# Patient Record
Sex: FEMALE | Employment: FULL TIME | URBAN - METROPOLITAN AREA
[De-identification: names, ages, dates, MRNs, and addresses within clinical notes are randomized per-mention and may not be internally consistent; named-entity substitution may affect disease eponyms.]

---

## 2018-06-14 ENCOUNTER — LAB ENCOUNTER (OUTPATIENT)
Dept: LAB | Age: 38
End: 2018-06-14
Payer: COMMERCIAL

## 2018-06-14 DIAGNOSIS — Z01.419 CERVICAL SMEAR, AS PART OF ROUTINE GYNECOLOGICAL EXAMINATION: ICD-10-CM

## 2018-06-14 PROCEDURE — 88175 CYTOPATH C/V AUTO FLUID REDO: CPT

## 2018-06-14 PROCEDURE — 87624 HPV HI-RISK TYP POOLED RSLT: CPT

## 2018-10-27 ENCOUNTER — HOSPITAL (OUTPATIENT)
Dept: OTHER | Age: 38
End: 2018-10-27
Attending: EMERGENCY MEDICINE

## 2019-09-03 PROCEDURE — 87591 N.GONORRHOEAE DNA AMP PROB: CPT | Performed by: NURSE PRACTITIONER

## 2019-09-03 PROCEDURE — 87491 CHLMYD TRACH DNA AMP PROBE: CPT | Performed by: NURSE PRACTITIONER

## 2020-02-21 PROBLEM — Z30.430 ENCOUNTER FOR INSERTION OF INTRAUTERINE CONTRACEPTIVE DEVICE (IUD): Status: ACTIVE | Noted: 2020-02-21

## 2020-02-21 PROBLEM — Z30.433 ENCOUNTER FOR REMOVAL AND REINSERTION OF INTRAUTERINE CONTRACEPTIVE DEVICE: Status: ACTIVE | Noted: 2020-02-21

## 2020-03-16 ENCOUNTER — TELEPHONE (OUTPATIENT)
Dept: BEHAVIORAL HEALTH | Age: 40
End: 2020-03-16

## 2020-08-24 ENCOUNTER — TELEPHONE (OUTPATIENT)
Dept: BEHAVIORAL HEALTH | Age: 40
End: 2020-08-24

## 2020-08-24 RX ORDER — ESCITALOPRAM OXALATE 20 MG/1
20 TABLET ORAL DAILY
Qty: 30 TABLET | Refills: 3 | Status: SHIPPED | OUTPATIENT
Start: 2020-08-24 | End: 2020-08-27 | Stop reason: SDUPTHER

## 2020-08-27 ENCOUNTER — HOSPITAL ENCOUNTER (OUTPATIENT)
Dept: BEHAVIORAL HEALTH | Age: 40
Discharge: STILL ADMITTED OR ADMINISTRATIVE | End: 2020-08-27
Attending: CLINICAL NURSE SPECIALIST

## 2020-08-27 DIAGNOSIS — F41.1 GENERALIZED ANXIETY DISORDER: Primary | ICD-10-CM

## 2020-08-27 PROCEDURE — 99213 OFFICE O/P EST LOW 20 MIN: CPT | Performed by: CLINICAL NURSE SPECIALIST

## 2020-08-27 RX ORDER — ESCITALOPRAM OXALATE 20 MG/1
20 TABLET ORAL DAILY
Qty: 30 TABLET | Refills: 5 | Status: SHIPPED | OUTPATIENT
Start: 2020-08-27 | End: 2021-08-23 | Stop reason: SDUPTHER

## 2020-08-27 ASSESSMENT — ENCOUNTER SYMPTOMS
CONSTITUTIONAL NEGATIVE: 1
EYES NEGATIVE: 1
RESPIRATORY NEGATIVE: 1
HEMATOLOGIC/LYMPHATIC NEGATIVE: 1
ALLERGIC/IMMUNOLOGIC NEGATIVE: 1
SLEEP DISTURBANCE: 1
GASTROINTESTINAL NEGATIVE: 1
ENDOCRINE NEGATIVE: 1
NERVOUS/ANXIOUS: 1
NEUROLOGICAL NEGATIVE: 1

## 2021-03-20 ENCOUNTER — IMMUNIZATION (OUTPATIENT)
Dept: LAB | Age: 41
End: 2021-03-20

## 2021-03-20 DIAGNOSIS — Z23 NEED FOR VACCINATION: Primary | ICD-10-CM

## 2021-03-20 PROCEDURE — 91300 COVID 19 PFIZER-BIONTECH: CPT

## 2021-03-20 PROCEDURE — 0001A COVID 19 PFIZER-BIONTECH: CPT

## 2021-04-10 ENCOUNTER — IMMUNIZATION (OUTPATIENT)
Dept: LAB | Age: 41
End: 2021-04-10

## 2021-04-10 DIAGNOSIS — Z23 NEED FOR VACCINATION: Primary | ICD-10-CM

## 2021-04-10 PROCEDURE — 0002A COVID 19 PFIZER-BIONTECH: CPT | Performed by: NURSE PRACTITIONER

## 2021-04-10 PROCEDURE — 91300 COVID 19 PFIZER-BIONTECH: CPT | Performed by: NURSE PRACTITIONER

## 2021-07-20 PROBLEM — R87.611 PAPANICOLAOU SMEAR OF CERVIX WITH ATYPICAL SQUAMOUS CELLS CANNOT EXCLUDE HIGH GRADE SQUAMOUS INTRAEPITHELIAL LESION (ASC-H): Status: ACTIVE | Noted: 2021-07-20

## 2021-08-12 PROCEDURE — 88342 IMHCHEM/IMCYTCHM 1ST ANTB: CPT | Performed by: OBSTETRICS & GYNECOLOGY

## 2021-08-12 PROCEDURE — 87625 HPV TYPES 16 & 18 ONLY: CPT | Performed by: OBSTETRICS & GYNECOLOGY

## 2021-08-12 PROCEDURE — 88305 TISSUE EXAM BY PATHOLOGIST: CPT | Performed by: OBSTETRICS & GYNECOLOGY

## 2021-08-12 PROCEDURE — 87624 HPV HI-RISK TYP POOLED RSLT: CPT | Performed by: OBSTETRICS & GYNECOLOGY

## 2021-08-23 ENCOUNTER — TELEPHONE (OUTPATIENT)
Dept: BEHAVIORAL HEALTH | Age: 41
End: 2021-08-23

## 2021-08-23 RX ORDER — ESCITALOPRAM OXALATE 20 MG/1
20 TABLET ORAL DAILY
Qty: 30 TABLET | Refills: 3 | Status: SHIPPED | OUTPATIENT
Start: 2021-08-23

## 2021-10-04 RX ORDER — ACETAMINOPHEN 500 MG
1000 TABLET ORAL ONCE
Status: CANCELLED | OUTPATIENT
Start: 2021-10-04 | End: 2021-10-04

## 2021-10-05 ENCOUNTER — LAB ENCOUNTER (OUTPATIENT)
Dept: LAB | Age: 41
End: 2021-10-05
Attending: OBSTETRICS & GYNECOLOGY
Payer: COMMERCIAL

## 2021-10-05 DIAGNOSIS — N87.9 CERVICAL DYSPLASIA: ICD-10-CM

## 2021-10-06 ENCOUNTER — ANESTHESIA EVENT (OUTPATIENT)
Dept: SURGERY | Facility: HOSPITAL | Age: 41
End: 2021-10-06
Payer: COMMERCIAL

## 2021-10-06 NOTE — ANESTHESIA PREPROCEDURE EVALUATION
PRE-OP EVALUATION    Patient Name: Jean Paul Bains    Admit Diagnosis: CERVICAL DYSPLASIA    Pre-op Diagnosis: CERVICAL DYSPLASIA    LOOP ELECTRICAL EXCISION PROCEDURE OF CERVIX/ COLPOSCOPY    Anesthesia Procedure: LOOP ELECTRICAL EXCISION PROCEDURE OF CERVI discussed with surgeon and patient.     Comment: I explained the intrinsic risks of MAC anesthesia to Cam Herson including intraoperative awareness/recall, PONV, post-operative pain/discomfort, risk of aspiration, sore throat, airway management and, conv

## 2021-10-07 ENCOUNTER — ANESTHESIA (OUTPATIENT)
Dept: SURGERY | Facility: HOSPITAL | Age: 41
End: 2021-10-07
Payer: COMMERCIAL

## 2021-10-07 ENCOUNTER — HOSPITAL ENCOUNTER (OUTPATIENT)
Facility: HOSPITAL | Age: 41
Setting detail: HOSPITAL OUTPATIENT SURGERY
Discharge: HOME | End: 2021-10-07
Attending: OBSTETRICS & GYNECOLOGY | Admitting: OBSTETRICS & GYNECOLOGY
Payer: COMMERCIAL

## 2021-10-07 VITALS
TEMPERATURE: 98 F | HEART RATE: 73 BPM | HEIGHT: 65 IN | DIASTOLIC BLOOD PRESSURE: 63 MMHG | RESPIRATION RATE: 16 BRPM | OXYGEN SATURATION: 97 % | SYSTOLIC BLOOD PRESSURE: 97 MMHG | WEIGHT: 141.63 LBS | BODY MASS INDEX: 23.6 KG/M2

## 2021-10-07 DIAGNOSIS — N87.9 CERVICAL DYSPLASIA: Primary | ICD-10-CM

## 2021-10-07 PROCEDURE — 88305 TISSUE EXAM BY PATHOLOGIST: CPT | Performed by: OBSTETRICS & GYNECOLOGY

## 2021-10-07 PROCEDURE — 0UBC8ZZ EXCISION OF CERVIX, VIA NATURAL OR ARTIFICIAL OPENING ENDOSCOPIC: ICD-10-PCS | Performed by: OBSTETRICS & GYNECOLOGY

## 2021-10-07 PROCEDURE — 81025 URINE PREGNANCY TEST: CPT

## 2021-10-07 PROCEDURE — 88307 TISSUE EXAM BY PATHOLOGIST: CPT | Performed by: OBSTETRICS & GYNECOLOGY

## 2021-10-07 RX ORDER — LIDOCAINE HYDROCHLORIDE AND EPINEPHRINE 10; 10 MG/ML; UG/ML
INJECTION, SOLUTION INFILTRATION; PERINEURAL AS NEEDED
Status: DISCONTINUED | OUTPATIENT
Start: 2021-10-07 | End: 2021-10-07 | Stop reason: HOSPADM

## 2021-10-07 RX ORDER — ONDANSETRON 2 MG/ML
4 INJECTION INTRAMUSCULAR; INTRAVENOUS EVERY 8 HOURS PRN
Status: CANCELLED | OUTPATIENT
Start: 2021-10-07

## 2021-10-07 RX ORDER — SODIUM CHLORIDE, SODIUM LACTATE, POTASSIUM CHLORIDE, CALCIUM CHLORIDE 600; 310; 30; 20 MG/100ML; MG/100ML; MG/100ML; MG/100ML
INJECTION, SOLUTION INTRAVENOUS CONTINUOUS
Status: DISCONTINUED | OUTPATIENT
Start: 2021-10-07 | End: 2021-10-07

## 2021-10-07 RX ORDER — KETOROLAC TROMETHAMINE 30 MG/ML
INJECTION, SOLUTION INTRAMUSCULAR; INTRAVENOUS AS NEEDED
Status: DISCONTINUED | OUTPATIENT
Start: 2021-10-07 | End: 2021-10-07 | Stop reason: SURG

## 2021-10-07 RX ORDER — ONDANSETRON 2 MG/ML
4 INJECTION INTRAMUSCULAR; INTRAVENOUS AS NEEDED
Status: DISCONTINUED | OUTPATIENT
Start: 2021-10-07 | End: 2021-10-07

## 2021-10-07 RX ORDER — METOCLOPRAMIDE HYDROCHLORIDE 5 MG/ML
INJECTION INTRAMUSCULAR; INTRAVENOUS AS NEEDED
Status: DISCONTINUED | OUTPATIENT
Start: 2021-10-07 | End: 2021-10-07 | Stop reason: SURG

## 2021-10-07 RX ORDER — ACETIC ACID 0.25 G/100ML
IRRIGANT IRRIGATION AS NEEDED
Status: DISCONTINUED | OUTPATIENT
Start: 2021-10-07 | End: 2021-10-07 | Stop reason: HOSPADM

## 2021-10-07 RX ORDER — LIDOCAINE HYDROCHLORIDE 10 MG/ML
INJECTION, SOLUTION EPIDURAL; INFILTRATION; INTRACAUDAL; PERINEURAL AS NEEDED
Status: DISCONTINUED | OUTPATIENT
Start: 2021-10-07 | End: 2021-10-07 | Stop reason: SURG

## 2021-10-07 RX ORDER — ONDANSETRON 4 MG/1
4 TABLET, FILM COATED ORAL EVERY 8 HOURS PRN
Status: CANCELLED | OUTPATIENT
Start: 2021-10-07

## 2021-10-07 RX ORDER — HYDROCODONE BITARTRATE AND ACETAMINOPHEN 5; 325 MG/1; MG/1
2 TABLET ORAL AS NEEDED
Status: DISCONTINUED | OUTPATIENT
Start: 2021-10-07 | End: 2021-10-07

## 2021-10-07 RX ORDER — MIDAZOLAM HYDROCHLORIDE 1 MG/ML
INJECTION INTRAMUSCULAR; INTRAVENOUS AS NEEDED
Status: DISCONTINUED | OUTPATIENT
Start: 2021-10-07 | End: 2021-10-07 | Stop reason: SURG

## 2021-10-07 RX ORDER — ONDANSETRON 2 MG/ML
INJECTION INTRAMUSCULAR; INTRAVENOUS AS NEEDED
Status: DISCONTINUED | OUTPATIENT
Start: 2021-10-07 | End: 2021-10-07 | Stop reason: SURG

## 2021-10-07 RX ORDER — HYDROMORPHONE HYDROCHLORIDE 1 MG/ML
0.4 INJECTION, SOLUTION INTRAMUSCULAR; INTRAVENOUS; SUBCUTANEOUS EVERY 5 MIN PRN
Status: DISCONTINUED | OUTPATIENT
Start: 2021-10-07 | End: 2021-10-07

## 2021-10-07 RX ORDER — HYDROCODONE BITARTRATE AND ACETAMINOPHEN 5; 325 MG/1; MG/1
1 TABLET ORAL AS NEEDED
Status: DISCONTINUED | OUTPATIENT
Start: 2021-10-07 | End: 2021-10-07

## 2021-10-07 RX ORDER — ACETAMINOPHEN 500 MG
1000 TABLET ORAL ONCE AS NEEDED
Status: DISCONTINUED | OUTPATIENT
Start: 2021-10-07 | End: 2021-10-07

## 2021-10-07 RX ORDER — NALOXONE HYDROCHLORIDE 0.4 MG/ML
80 INJECTION, SOLUTION INTRAMUSCULAR; INTRAVENOUS; SUBCUTANEOUS AS NEEDED
Status: DISCONTINUED | OUTPATIENT
Start: 2021-10-07 | End: 2021-10-07

## 2021-10-07 RX ORDER — FERRIC SUBSULFATE 20-22G/100
SOLUTION, NON-ORAL MISCELLANEOUS AS NEEDED
Status: DISCONTINUED | OUTPATIENT
Start: 2021-10-07 | End: 2021-10-07 | Stop reason: HOSPADM

## 2021-10-07 RX ORDER — IODINE SOLUTION STRONG 5% (LUGOL'S) 5 %
SOLUTION ORAL AS NEEDED
Status: DISCONTINUED | OUTPATIENT
Start: 2021-10-07 | End: 2021-10-07 | Stop reason: HOSPADM

## 2021-10-07 RX ORDER — DEXAMETHASONE SODIUM PHOSPHATE 4 MG/ML
VIAL (ML) INJECTION AS NEEDED
Status: DISCONTINUED | OUTPATIENT
Start: 2021-10-07 | End: 2021-10-07 | Stop reason: SURG

## 2021-10-07 RX ADMIN — SODIUM CHLORIDE, SODIUM LACTATE, POTASSIUM CHLORIDE, CALCIUM CHLORIDE: 600; 310; 30; 20 INJECTION, SOLUTION INTRAVENOUS at 07:35:00

## 2021-10-07 RX ADMIN — LIDOCAINE HYDROCHLORIDE 50 MG: 10 INJECTION, SOLUTION EPIDURAL; INFILTRATION; INTRACAUDAL; PERINEURAL at 07:35:00

## 2021-10-07 RX ADMIN — KETOROLAC TROMETHAMINE 30 MG: 30 INJECTION, SOLUTION INTRAMUSCULAR; INTRAVENOUS at 08:22:00

## 2021-10-07 RX ADMIN — SODIUM CHLORIDE, SODIUM LACTATE, POTASSIUM CHLORIDE, CALCIUM CHLORIDE: 600; 310; 30; 20 INJECTION, SOLUTION INTRAVENOUS at 08:37:00

## 2021-10-07 RX ADMIN — MIDAZOLAM HYDROCHLORIDE 2 MG: 1 INJECTION INTRAMUSCULAR; INTRAVENOUS at 07:35:00

## 2021-10-07 RX ADMIN — METOCLOPRAMIDE HYDROCHLORIDE 10 MG: 5 INJECTION INTRAMUSCULAR; INTRAVENOUS at 07:44:00

## 2021-10-07 RX ADMIN — DEXAMETHASONE SODIUM PHOSPHATE 4 MG: 4 MG/ML VIAL (ML) INJECTION at 07:44:00

## 2021-10-07 RX ADMIN — ONDANSETRON 4 MG: 2 INJECTION INTRAMUSCULAR; INTRAVENOUS at 07:44:00

## 2021-10-07 NOTE — H&P
[facility] Patient Status:  Hospital Outpatient Surgery    1980 MRN [id number]   Location [address] [place] [address] Attending Karlee Lee MD   Hosp Day # 0 RICHY JACKMAN     SUBJECTIVE: 1      # Outcome Date GA Lbr Rigoberto/2nd Weight Sex Delivery Anes PTL Lv   3 AB 10/2006           2 Term 03/01/04 40w0d    NORMAL SPONT   SUNITA   1 AB 06/2003               Past GYN History:   Menarche: 15 (8/12/2021  5:06 PM)  Period Cycle (Days): 28 (8/1 noted.  Without clubbing or cyanosis.     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

## 2021-10-07 NOTE — OPERATIVE REPORT
OPERATIVE REPORT   PREOPERATIVE DIAGNOSIS: Cervical dysplasia. Hi grade DON  POSTOPERATIVE DIAGNOSIS: Cervical dysplasia. PROCEDURE PERFORMED:     1.  [facility] - Loop electrocautery excision procedure (LEEP)    2. Colposcopy  SURGEON:  Edith Mccurdy MD

## 2021-10-07 NOTE — ANESTHESIA POSTPROCEDURE EVALUATION
[id number] Patient Status:  Hospital Outpatient Surgery   Age/Gender 39year old female MRN [id number]   Location [address] [place] [address] Attending Brian Goldstein MD   Hosp Day # 0 PCP SAVANNA JACKMAN       Anesthesia Post

## 2021-10-28 PROBLEM — Z98.890 S/P LEEP: Status: ACTIVE | Noted: 2021-10-28

## 2025-06-25 NOTE — PROGRESS NOTES
GYN H&P     Genetic questionnaire reviewed with the patient and she will be referred for genetic counseling if the questionnaire had any positive results.    The [facility] intake form was also reviewed regarding contraception, menstrual periods, urinary health, and vaginal / sexual health    The patient was offered a medical chaperone during the visit    2025  5:52 PM    Chief Complaint   Patient presents with    Physical     Req. Blood work for STD screening, doing well       HPI: Anne is a 44 year old  Patient's last menstrual period was 06/10/2025 (approximate).  (contraception: Liletta IUD ) here for her annual gyn exam.     She has no complaints.   Menses are regular. Denies any pelvic or breast complaints.   Satisfied with current contraception.     Previous encounters and chart reviewed.     OB History    Para Term  AB Living   3 1 1  2 1   SAB IAB Ectopic Multiple Live Births       1      # Outcome Date GA Lbr Rigoberto/2nd Weight Sex Type Anes PTL Lv   3 AB 10/2006           2 Term 04 40w0d    NORMAL SPONT   SUNITA   1 AB 2003               GYN hx:   Menarche: 15  Period Cycle (Days): 28  Period Duration (Days): 4-5  Use of Birth Control (if yes, specify type): Liletta IUD  Date When Birth Control Last Used: Inserted on 2020  Pap Date: 21  Pap Result Notes: 21 ASC-H     (2018 neg neg)  Follow Up Recommendation: due today; LEEP 10/07/2021)      Past Medical History[1]  Past Surgical History[2]  Allergies[3]  Medications Ordered Prior to Encounter[4]  Family History[5]  Social History     Socioeconomic History    Marital status: Single     Spouse name: Not on file    Number of children: Not on file    Years of education: Not on file    Highest education level: Not on file   Occupational History    Not on file   Tobacco Use    Smoking status: Never    Smokeless tobacco: Never   Vaping Use    Vaping status: Never Used   Substance and Sexual Activity     Alcohol use: Not Currently    Drug use: Not Currently    Sexual activity: Yes     Partners: Male     Birth control/protection: I.U.D.   Other Topics Concern    Not on file   Social History Narrative    Not on file     Social Drivers of Health     Food Insecurity: No Food Insecurity (6/25/2025)    NCSS - Food Insecurity     Worried About Running Out of Food in the Last Year: No     Ran Out of Food in the Last Year: No   Transportation Needs: No Transportation Needs (6/25/2025)    NCSS - Transportation     Lack of Transportation: No   Stress: Not on file   Housing Stability: Not At Risk (6/25/2025)    NCSS - Housing/Utilities     Has Housing: Yes     Worried About Losing Housing: No     Unable to Get Utilities: No       ROS:     Review of Systems:  General: denies fevers, chills, fatigue and malaise.   Eyes: no visual changes, denies headaches  ENT: no complaints, denies earaches, runny nose, epistaxis, throat pain or sore throat  Respiratory: denies SOB, dyspnea, cough or wheezing  Cardiovascular: denies chest pain, palpitations, exercise intolerance   GI: denies abdominal pain, diarrhea, constipation  : no complaints, denies dysuria, increased urinary frequency. Menses regular, no dysmenorrhea, no menorrhagia, no dyspareunia   Hematological/lymphatic: denies history of excessive bleeding or bruising, denies dizziness, lightheadedness.   Breast: denies rashes, skin changes, pain, lumps or discharge   Psychiatric: denies depression, changes in sleep patterns, anxiety  Endocrine: denies hot or cold intolerance, mood changes   Neurological: denies changes in sight, smell, hearing or taste. Denies seizures or tremors  Immunological: denies allergies, denies anaphylaxis, or swollen lymph nodes  Musculoskeletal: denies joint pain, morning stiffness, decreased range of motion         O /74   Ht 65\"   Wt 146 lb 9.6 oz (66.5 kg)   LMP 06/10/2025 (Approximate)   BMI 24.40 kg/m²         Wt Readings from Last 6  Encounters:   06/25/25 146 lb 9.6 oz (66.5 kg)   10/28/21 143 lb (64.9 kg)   10/07/21 141 lb 9.6 oz (64.2 kg)   09/17/21 142 lb (64.4 kg)   07/20/21 139 lb (63 kg)   02/21/20 124 lb (56.2 kg)     Exam:   GENERAL: well developed, well nourished, in no apparent distress, oriented.  SKIN: no rashes, no suspicious lesions  HEENT: normal  NECK: supple; no thyromegaly, no adenopathy  LUNGS: clear to auscultation  CARDIOVASCULAR: normal S1, S2, RRR  BREASTS: soft, nontender, no palpable masses or nodes, no nipple discharge, no skin changes, no axillary adenopathy  ABDOMEN: no scars,  soft, non distended; non tender, no masses  PELVIC: External Genitalia: Normal appearing, no lesions.    Vagina: normal pink mucosa, no lesions, normal clear discharge.    Bladder well supported.  No  anterior or posterior hernias    Cervix: s/p CKC nulliparous - blue string visible, no lesions , No CMT     Uterus: AVAF, mobile, non tender, normal size    Adnexa: non tender, no masses, normal size    Rectal: deferred  EXTREMITIES:  non tender without edema    Pelvic ultrasound for lost IUD done on January 2, 2020     Findings: Anteverted anteflexed uterus with an IUD in good position.  The uterus measures 8.2 x 5.0 x 3.4 cm and has a 4 mm endometrial stripe.  Both ovaries are seen and normal the right ovary measures 2.4 x 2.4 x 1.9 cm.  The left ovary measures 2.8 x 2.7 x 2.1 cm.  There is no free fluid in the cul-de-sac     Impression: IUD in good position   Patient counseled on:    Diet/exercise.      Self Breast Exams     Safe sex practices / and living environment     Vaccines:  Annual Flu, Tdap +/- Gardasil not recommended (up to 45 yrs).      Pneumococcal at 65 yrs old, Shingles at 60 yrs old          Pap: neg/neg - Year:  done  GC/Chlamydia:  na  Mammogram:  negative,  2023  Dexa:  na  Colonoscopy / Cologuard:   na  Lipid / Cholesterol:         A/P: Patient is 44 year old female with no complaints. Here for well woman exam.     Meds  This Visit:    Requested Prescriptions      No prescriptions requested or ordered in this encounter       1. Visit for screening mammogram  - [facility] ELIF 2D+3D SCREENING BILAT (CPT=77067/93975); Future    2. Encounter for well woman exam with routine gynecological exam  - ThinPrep PAP Smear B; Future  - Hpv High Risk , Thin Prep Collect; Future  - ThinPrep PAP Smear B  - Hpv High Risk , Thin Prep Collect    3. Annual physical exam    4. S/P LEEP    5. S/P cone biopsy of cervix, 4/2022 - at [facility]    6. Cervical high risk human papillomavirus (HPV) DNA test positive    7. Screening for STD (sexually transmitted disease)  - Chlamydia/Gc Amplification; Future  - Chlamydia/Gc Amplification    8. Yeast vaginitis    Use OTC antifungal  Requesting STD testing -     Return in about 1 year (around 6/25/2026) for Well Woman Exam.    Willi Johnson MD   6/25/2025  5:52 PM       This note was created by [employer] voice recognition. Errors in content may be related to improper recognition by the system; efforts to review and correct have been done but errors may still exist. Please contact me with any questions.    Note to patient and family   The 21st Century Cures Act makes medical notes available to patients in the interest of transparency.  However, please be advised that this is a medical document.  It is intended as cvpy-af-shea communication.  It is written in medical language which may contain abbreviations or verbiage that are technical and unfamiliar.  It may appear blunt or direct.  Medical documents are intended to carry relevant information, facts as evident, and the clinical opinion of the practitioner.           [1]   Past Medical History:   Anxiety    Depression    Visual impairment    glasses   [2]   Past Surgical History:  Procedure Laterality Date    Colposcopy, cervix w/upper adjacent vagina; w/biopsy(s), cervix  08/12/2021    D & c  01/2003    missed ab    Leep  10/07/2021    Other surgical history  07/2014     mirena iud inserted    [3] No Known Allergies  [4]   Current Outpatient Medications on File Prior to Visit   Medication Sig Dispense Refill    nystatin-triamcinolone 100,000-0.1 Units/g-% External Ointment Apply 1 Application topically 2 (two) times daily. 30 g 0    metRONIDAZOLE (FLAGYL) 500 MG Oral Tab 1 PO BID X 7 days 14 tablet 1    Levocetirizine Dihydrochloride (XYZAL OR) Take by mouth daily.      Levonorgestrel (LILETTA, 52 MG,) 19.5 MCG/DAY Intrauterine IUD 1 each by Intrauterine route one time.      escitalopram 20 MG Oral Tab TK 1 T PO QD  0    clonazePAM (KLONOPIN OR) Take by mouth as needed.       No current facility-administered medications on file prior to visit.   [5] History reviewed. No pertinent family history.

## (undated) DEVICE — REDUCER FITTING (NON-STERILE) 7/8 IN (22 MM) TO 1/4 IN (6.4 MM): Brand: CONMED BUFFALO FILTER

## (undated) DEVICE — GYN CDS: Brand: MEDLINE INDUSTRIES, INC.

## (undated) DEVICE — SOL  .9 1000ML BTL

## (undated) DEVICE — NEEDLE SPINAL 22X3-1/2 BLK

## (undated) DEVICE — STERILE POLYISOPRENE POWDER-FREE SURGICAL GLOVES: Brand: PROTEXIS

## (undated) DEVICE — SUTURE SILK 2-0 SH

## (undated) DEVICE — STERILE SYNTHETIC POLYISOPRENE POWDER-FREE SURGICAL GLOVES WITH HYDROGEL COATING: Brand: PROTEXIS

## (undated) DEVICE — ELECTRODE LOOP GREEN 15X12

## (undated) DEVICE — ELECTRODE BALL 5MM DBL-511

## (undated) DEVICE — PROCTO SWABS: Brand: DEROYAL

## (undated) DEVICE — REM POLYHESIVE ADULT PATIENT RETURN ELECTRODE: Brand: VALLEYLAB

## (undated) DEVICE — CAUTERY PENCIL

## (undated) DEVICE — SCD SLEEVE KNEE HI BLEND

## (undated) DEVICE — NEEDLE SPINAL 20X3-1/2 YELLOW